# Patient Record
Sex: FEMALE | URBAN - METROPOLITAN AREA
[De-identification: names, ages, dates, MRNs, and addresses within clinical notes are randomized per-mention and may not be internally consistent; named-entity substitution may affect disease eponyms.]

---

## 2022-10-27 ENCOUNTER — OFFICE VISIT (OUTPATIENT)
Dept: URBAN - METROPOLITAN AREA CLINIC 6 | Facility: CLINIC | Age: 52
End: 2022-10-27
Payer: COMMERCIAL

## 2022-10-27 DIAGNOSIS — H57.13 BILATERAL OCULAR PAIN: Primary | ICD-10-CM

## 2022-10-27 PROCEDURE — 92002 INTRM OPH EXAM NEW PATIENT: CPT | Performed by: OPTOMETRIST

## 2022-10-27 RX ORDER — PREDNISOLONE ACETATE 10 MG/ML
1 % SUSPENSION/ DROPS OPHTHALMIC
Qty: 5 | Refills: 0 | Status: ACTIVE
Start: 2022-10-27

## 2022-10-27 ASSESSMENT — INTRAOCULAR PRESSURE
OD: 17
OS: 20

## 2022-10-27 NOTE — IMPRESSION/PLAN
Impression: Bilateral ocular pain: H57.13. Plan: Pt has vasculitis and is under Dr Nenita Smalls (kidney specialist). Does get infusions and is on oral prednisone. Rx Prednisilone acetate 1% QID OU and provided taper instructions in advance.